# Patient Record
(demographics unavailable — no encounter records)

---

## 2024-11-18 NOTE — HISTORY OF PRESENT ILLNESS
[FreeTextEntry1] : CPE [de-identified] : 40yo female with PMH of HLD who presents to the office for annual physical examination.   She is feeling well today.   Reports this year she had a significant episode of dizziness, admitted to [facility] in August 2024 to rule out stroke. She was worked up with MRI, ECG and bloodwork which were normal. She was suspected to have possible vertigo. She had dizziness for about a month, but now improved. She was recommended to see a neurologist, but never did. Will follow up cardiologist.  Reports all labs normal except for moderately elevated cholesterol, known to patient in the past. Has been working on diet. Started Omega 3 supplement.   Follows regularly with GYN. Last seen in 4/24 for annual GYN. Had normal mammogram 9/2024.   Family history of colon cancer in her maternal aunt, who passed away in her 50s. She is concerned and would like to be referred for a colonoscopy.

## 2024-11-18 NOTE — ASSESSMENT
[FreeTextEntry1] : #HCM - Patient presenting for annual physical exam - Declines flu vaccine today - Up to date with pap smear - Up to date with mammogram - Family history of colon cancer in maternal aunt in her 50s. While this is not a first degree relative, patient would like to be referred to GI for early colonoscopy for colon cancer screening. Referred - Will check routine blood work fasting and call patient with results

## 2024-11-18 NOTE — HEALTH RISK ASSESSMENT
[Never] : Never [Patient reported PAP Smear was normal] : Patient reported PAP Smear was normal [Very Good] : ~his/her~  mood as very good [Yes] : Yes [Monthly or less (1 pt)] : Monthly or less (1 point) [1 or 2 (0 pts)] : 1 or 2 (0 points) [Never (0 pts)] : Never (0 points) [No] : In the past 12 months have you used drugs other than those required for medical reasons? No [0] : 2) Feeling down, depressed, or hopeless: Not at all (0) [PHQ-2 Negative - No further assessment needed] : PHQ-2 Negative - No further assessment needed [Patient reported mammogram was normal] : Patient reported mammogram was normal [With Family] : lives with family [Employed] : employed [Smoke Detector] : smoke detector [Carbon Monoxide Detector] : carbon monoxide detector [Seat Belt] :  uses seat belt [Sunscreen] : uses sunscreen [Audit-CScore] : 1 [de-identified] : Pilates, [employer] exercise bike, walking, weightlifting  [de-identified] : does have a sweet tooth, tries to keep balanced  [[id number]] : 0 [Reports changes in hearing] : Reports no changes in hearing [Reports changes in vision] : Reports no changes in vision [Reports changes in dental health] : Reports no changes in dental health [MammogramDate] : 09/24 [MammogramComments] : dense breast tissue with US [PapSmearDate] : 04/24 [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

## 2024-12-04 NOTE — HISTORY OF PRESENT ILLNESS
[FreeTextEntry1] : Pt is a 40 y/o F PMH HLD, preDM Family hx: father CAD/PCI 50's, mother HLD/HTN.    She had episode of vertigo this past fall Pt reports feeling well and has no active cardiac complaints - denies CP, SOB, palpitations, dizziness, syncope, edema, orthopnea, PND, orthopnea.  No exertional symptoms.   TTE 02/2023 EF 55-60%, trace AI/MR/TR ETT 02/2023 no ischemic changes  PMH: HLD, preDM Family hx: father CAD/PCI 50's, mother HLD/HTN Smoking status: never social ETOH no drug use Current exercise: 5x/week Pilates Daily water intake: 32 oz Daily caffeine intake: 1 cup coffee OTC medications: MVI NKDA Previous hospitalizations: none 0 children

## 2024-12-04 NOTE — DISCUSSION/SUMMARY
[EKG obtained to assist in diagnosis and management of assessed problem(s)] : EKG obtained to assist in diagnosis and management of assessed problem(s) [FreeTextEntry1] : Pt is a 42 y/o F PMH HLD, preDM Family hx: father CAD/PCI 50's, mother HLD/HTN.  Family History: We discussed the importance of aggressive risk factor modification, including continuing medications as directed, following a healthy diet of unprocessed, low saturated fat and carbohydrate diet as close to a plant based or Mediterranean as possible, regular exercise at least 30 minutes of cardiovascular exercise daily.  Also advised to report any symptoms immediately.  HLD/preDM: Advised lifestyle modifications  She wishes to continue to try diet/exercise repeat labs in 4-6 m  Pt will return in 12 mnths or sooner as needed but I encouraged communication via phone or portal if necessary.  The described plan was discussed with the pt.  All questions and concerns were addressed to the best of my knowledge.